# Patient Record
Sex: MALE | Race: WHITE
[De-identification: names, ages, dates, MRNs, and addresses within clinical notes are randomized per-mention and may not be internally consistent; named-entity substitution may affect disease eponyms.]

---

## 2018-03-19 ENCOUNTER — HOSPITAL ENCOUNTER (INPATIENT)
Dept: HOSPITAL 74 - FM/S | Age: 68
LOS: 2 days | Discharge: HOME HEALTH | DRG: 470 | End: 2018-03-21
Attending: ORTHOPAEDIC SURGERY | Admitting: ORTHOPAEDIC SURGERY
Payer: COMMERCIAL

## 2018-03-19 VITALS — BODY MASS INDEX: 33 KG/M2

## 2018-03-19 DIAGNOSIS — I10: ICD-10-CM

## 2018-03-19 DIAGNOSIS — E11.9: ICD-10-CM

## 2018-03-19 DIAGNOSIS — M16.12: Primary | ICD-10-CM

## 2018-03-19 DIAGNOSIS — E78.5: ICD-10-CM

## 2018-03-19 PROCEDURE — 8E0W0CZ ROBOTIC ASSISTED PROCEDURE OF TRUNK REGION, OPEN APPROACH: ICD-10-PCS | Performed by: ORTHOPAEDIC SURGERY

## 2018-03-19 PROCEDURE — 0SRB03A REPLACEMENT OF LEFT HIP JOINT WITH CERAMIC SYNTHETIC SUBSTITUTE, UNCEMENTED, OPEN APPROACH: ICD-10-PCS | Performed by: ORTHOPAEDIC SURGERY

## 2018-03-19 RX ADMIN — CELECOXIB SCH MG: 200 CAPSULE ORAL at 21:17

## 2018-03-19 RX ADMIN — CEFAZOLIN SODIUM SCH MLS/HR: 2 SOLUTION INTRAVENOUS at 18:20

## 2018-03-19 RX ADMIN — METFORMIN HYDROCHLORIDE SCH MG: 500 TABLET ORAL at 15:57

## 2018-03-19 RX ADMIN — OXYCODONE HYDROCHLORIDE AND ACETAMINOPHEN SCH MG: 500 TABLET ORAL at 21:20

## 2018-03-19 RX ADMIN — KETOROLAC TROMETHAMINE SCH MG: 30 INJECTION INTRAMUSCULAR; INTRAVENOUS at 17:33

## 2018-03-19 RX ADMIN — DOCUSATE SODIUM,SENNOSIDES SCH TABLET: 50; 8.6 TABLET, FILM COATED ORAL at 21:20

## 2018-03-19 RX ADMIN — OXYCODONE HYDROCHLORIDE SCH MG: 10 TABLET, FILM COATED, EXTENDED RELEASE ORAL at 21:17

## 2018-03-19 RX ADMIN — ACETAMINOPHEN SCH MG: 325 TABLET ORAL at 17:32

## 2018-03-19 RX ADMIN — GABAPENTIN SCH MG: 300 CAPSULE ORAL at 21:17

## 2018-03-19 RX ADMIN — KETOROLAC TROMETHAMINE SCH: 30 INJECTION INTRAMUSCULAR; INTRAVENOUS at 13:44

## 2018-03-19 NOTE — HP
Admitting History and Physical





- Admission


Chief Complaint: Left hip osteoarthritis x years


History of Present Illness: 


67 year old male presents in regard to his left hip. Longstanding history of 

left hip osteoarthritis. Patient complains of pain, limited ROM, difficulty 

ambulating and difficulty completing activities of daily living, such as 

putting on his socks and shoes. Patient has failed all conservative treatment 

measures including PO medication, injections, activity modification and 

exercise program. At this point, patient would like to proceed with the left 

total hip arthroplasty - MAKOplasty.





History Source: Patient





- Past Medical History


Cardiovascular: Yes: HTN, Hyperlipdemia


Endocrine: Yes: Diabetes Mellitus





- Past Surgical History


Additional Past Surgical History: 


See written history & physical








- Smoking History


Smoking history: Never smoked


Have you smoked in the past 12 months: No





- Alcohol/Substance Use


Hx Alcohol Use: No (RARELY)





Home Medications





- Allergies


Allergies/Adverse Reactions: 


 Allergies











Allergy/AdvReac Type Severity Reaction Status Date / Time


 


No Known Drug Allergies Allergy   Verified 03/15/18 13:10














- Home Medications


Home Medications: 


Ambulatory Orders





Aspirin [Ecotrin] 81 mg PO DAILY 03/15/18 


Atorvastatin Ca [Lipitor] 40 mg PO DAILY 03/15/18 


Metformin HCl 500 mg PO BID 03/15/18 


Ramipril 10 mg PO DAILY 03/15/18 











Review of Systems





- Review of Systems


Musculoskeletal: reports: Decreased ROM (left hip), Joint Pain (left hip)





Physical Examination


Vital Signs: 


 Vital Signs











Temperature  98.4 F   03/19/18 06:38


 


Pulse Rate  84   03/19/18 06:38


 


Respiratory Rate  18   03/19/18 06:38


 


Blood Pressure  150/92   03/19/18 06:38


 


O2 Sat by Pulse Oximetry (%)      











Constitutional: Yes: Well Nourished, No Distress


Eyes: Yes: Conjunctiva Clear


HENT: Yes: Atraumatic, Normocephalic


Neck: Yes: Supple


Cardiovascular: Yes: Regular Rate and Rhythm


Respiratory: Yes: Regular


Gastrointestinal: Yes: Soft


...Rectal Exam: Yes: Deferred


Musculoskeletal: Yes: Joint Stiffness (left hip), Joint Swelling (left hip)





Assessment/Plan


67 year old male presenting in regard to his left hip. Longstanding history of 

left hip osteoarthritis. Patient complains of pain, limited ROM, difficulty 

ambulating and difficulty completing activities of daily living, such as 

putting on his socks and shoes. Patient has failed all conservative treatment 

measures including PO medication, injections, activity modification and 

exercise program. Pros, cons, risks, benefits and alternatives of a left total 

hip arthroplasty - MAKOplasty were explained to the patient in detail. Patient 

confirms his understanding and wishes to proceed with a left total hip 

arthroplasty - MAKOplasty.

## 2018-03-19 NOTE — OP
Operative Note





- Note:


Operative Date: 03/19/18


Pre-Operative Diagnosis: left hip OA


Operation: left ana SERJIO


Post-Operative Diagnosis: Same as Pre-op


Surgeon: Pankaj Adan


Assistant: Val Darby


Anesthesia: Spinal


Estimated Blood Loss (mls): 200

## 2018-03-19 NOTE — SPEC
DATE OF OPERATION:  03/19/2018

 

PREOPERATIVE DIAGNOSIS:  Left hip osteoarthritis.

 

POSTOPERATIVE DIAGNOSIS:  Left hip osteoarthritis.

 

PROCEDURE:  Left total hip replacement with MAKOplasty robotic navigation.

 

ATTENDING:  Radha Finch MD 

 

ASSISTANT:  VASU Watson 

 

ANESTHESIA:  Spinal plus sedation.

 

ESTIMATED BLOOD LOSS:  200 mL.

 

COMPLICATIONS:  None.

 

SPECIMENS:  Resected bone was sent for pathology analysis.

 

DISPOSITION:  The patient was taken to the PACU in stable condition.

 

IMPLANTS USED:  [place] Accolade II size 6 femoral component, [place] Tritanum 54-mm

acetabular component with MDM liner and head ball and ceramic inner head.

 

INDICATIONS:  This is a 67-year-old male who presented to the office complaining of

severe left hip pain.  He was seen and examined by Dr. Finch and diagnosed with

severe left hip osteoarthritis.  He was initially treated nonoperatively with

medication and physical therapy but continued to have severe pain that limited his

activities of daily life.  We discussed surgery, and the patient was indicated for a

left total hip replacement.  The risks, benefits, and alternatives to the surgery

were explained to the patient in great detail, and he elected to proceed with the

procedure.

 

DESCRIPTION OF PROCEDURE:  On the day of surgery, the patient was taken to the

operating room and placed on the OR table.  Spinal anesthesia was administered by the

anesthesiologist.  The patient was then positioned in the lateral decubitus position

on the table and all bony prominences were padded.  An axillary roll was placed.  The

operative hip was then prepped and draped in the usual sterile fashion and

intravenous antibiotics were given for infection prophylaxis.  A surgical time-out

was then performed with the team, and the patients identity, procedure, side,

availability of implants, and the administration of antibiotics were confirmed.  

 

An approximately 15-cm longitudinal incision was made through the skin centered on

the greater trochanter of the hip.  This dissection was carried down through the

subcutaneous tissues to the deep fascia.  This fascia was then incised and a Cobra

was placed around the inferior femoral neck.  Electrocautery was used to reflect the

anterior 40% of the gluteus medius and minimus starting at the musculotendinous

junction and leaving a cuff for closure.  This was reflected to reveal the capsule of

the hip joint.  An anterior capsulectomy was performed and the femoral head and neck

were visualized.  Grade 4 changes were noted diffusely throughout the joint.

 

At this point, three small stab incisions were made superior to the main incision

along the iliac crest.  Three self-drilling Steinmann pins were then placed and the

Sweetgreen pelvic array was attached.   Reference points on the limb were then entered into

the robotic device and the limb length deficiency, offset, and femoral neck resection

level were then calculated by the software.  The hip was then dislocated with

traction and external rotation.  An oscillating saw was used to make the femoral neck

cut at the level previously templated, and the femoral head was removed. 

 

Attention was then turned to the acetabulum.  Retractors were then placed around the

acetabulum and the labrum was removed.  An acetabular checkpoint pin and the Sweetgreen

software were used to register the contours of the acetabulum.  The acetabulum was

then reamed in a single stage to the preoperatively templated size using the Sweetgreen

robotic arm.  The appropriately sized cup was then impacted and had solid fixation as

well as the preset inclination and version of 40 and 20 degrees, respectively.  A

polyethylene liner was then placed in the cup.

 

Attention was then turned back to the femur, which was externally rotated for

improved visualization. A femoral neck elevator was used to present the femoral neck

cut, a box osteotome was used to enter the femoral canal, and a canal finder was used

to go down the femoral shaft.  The Akl broaches were used sequentially until the

optimal scratch fit was achieved.  This correlated with the preoperatively templated

size.  From here, several different offset head and neck configurations were tested

until excellent stability and length were obtained.  These measurements were

quantified using the Sweetgreen software.

 

All trial components were then removed, the femur was copiously irrigated, and the

final components were placed.  Leg length and stability were checked again and found

to be excellent.  Irrigation was performed again.  After final implants were placed,

a 3-minute dilute Betadine lavage was performed.  Following this, the wound was

thoroughly irrigated with normal saline via a pulsatile lavage device, and wound

closure was begun.  Wound closure was started by repairing the abductor muscles with

a no. 2 FiberWire stitch in a Krackow configuration passed through bone tunnels in

the greater trochanter and tied over a bony bridge.  This repair was then reinforced

with a 0 V-Loc 180 barbed suture.  Next, no. 1 Polysorb and 0 V-Loc 180 were used to

close the fascia.  The deep subcutaneous tissue was closed with no. 1 Polysorb

sutures, and 2-0 Polysorb was used for the superficial subcutaneous tissue.  The skin

was closed using both 3-0 V-Loc 90 suture in a running subcuticular fashion and

SwiftSet skin adhesive.   The Kal array and pins were removed from the iliac crest

and the stab incision sites were irrigated and closed with 4-0 Polysorb sutures and

SwiftSet skin adhesive.  Once this was completed, a sterile dressing was applied. 

The patient was then awakened and taken to the PACU in stable condition.

 

 

 

RADHA FINCH M.D.

 

[id number]

DD: 03/19/2018 11:59

DT: 03/19/2018 14:22

Job #:  [id number]

## 2018-03-20 LAB
ANION GAP SERPL CALC-SCNC: 8 MMOL/L (ref 8–16)
BUN SERPL-MCNC: 26 MG/DL (ref 7–18)
CALCIUM SERPL-MCNC: 8.2 MG/DL (ref 8.4–10.2)
CHLORIDE SERPL-SCNC: 100 MMOL/L (ref 98–107)
CO2 SERPL-SCNC: 22 MMOL/L (ref 22–28)
CREAT SERPL-MCNC: 1.2 MG/DL (ref 0.6–1.3)
DEPRECATED RDW RBC AUTO: 12.6 % (ref 11.9–15.9)
GLUCOSE SERPL-MCNC: 218 MG/DL (ref 74–106)
HCT VFR BLD CALC: 40 % (ref 35.4–49)
HGB BLD-MCNC: 13.8 GM/DL (ref 11.7–16.9)
MCH RBC QN AUTO: 29.5 PG (ref 25.7–33.7)
MCHC RBC AUTO-ENTMCNC: 34.5 G/DL (ref 32–35.9)
MCV RBC: 85.4 FL (ref 80–96)
PLATELET # BLD AUTO: 200 K/MM3 (ref 134–434)
PMV BLD: 8.5 FL (ref 7.5–11.1)
POTASSIUM SERPLBLD-SCNC: 4.3 MMOL/L (ref 3.5–5.1)
RBC # BLD AUTO: 4.69 M/MM3 (ref 4–5.6)
SODIUM SERPL-SCNC: 130 MMOL/L (ref 136–145)
WBC # BLD AUTO: 13.6 K/MM3 (ref 4–10.8)

## 2018-03-20 RX ADMIN — ACETAMINOPHEN SCH MG: 325 TABLET ORAL at 00:11

## 2018-03-20 RX ADMIN — OXYCODONE HYDROCHLORIDE AND ACETAMINOPHEN SCH MG: 500 TABLET ORAL at 09:35

## 2018-03-20 RX ADMIN — CEFAZOLIN SODIUM SCH MLS/HR: 2 SOLUTION INTRAVENOUS at 01:07

## 2018-03-20 RX ADMIN — OXYCODONE HYDROCHLORIDE SCH MG: 10 TABLET, FILM COATED, EXTENDED RELEASE ORAL at 22:22

## 2018-03-20 RX ADMIN — DOCUSATE SODIUM,SENNOSIDES SCH TABLET: 50; 8.6 TABLET, FILM COATED ORAL at 22:22

## 2018-03-20 RX ADMIN — PANTOPRAZOLE SODIUM SCH MG: 40 TABLET, DELAYED RELEASE ORAL at 09:35

## 2018-03-20 RX ADMIN — METFORMIN HYDROCHLORIDE SCH MG: 500 TABLET ORAL at 17:47

## 2018-03-20 RX ADMIN — CELECOXIB SCH MG: 200 CAPSULE ORAL at 09:35

## 2018-03-20 RX ADMIN — ATORVASTATIN CALCIUM SCH MG: 40 TABLET, FILM COATED ORAL at 09:35

## 2018-03-20 RX ADMIN — GABAPENTIN SCH MG: 300 CAPSULE ORAL at 09:35

## 2018-03-20 RX ADMIN — CELECOXIB SCH MG: 200 CAPSULE ORAL at 22:22

## 2018-03-20 RX ADMIN — ASPIRIN 325 MG ORAL TABLET SCH MG: 325 PILL ORAL at 08:25

## 2018-03-20 RX ADMIN — KETOROLAC TROMETHAMINE SCH MG: 30 INJECTION INTRAMUSCULAR; INTRAVENOUS at 00:11

## 2018-03-20 RX ADMIN — ACETAMINOPHEN SCH: 325 TABLET ORAL at 12:14

## 2018-03-20 RX ADMIN — GABAPENTIN SCH MG: 300 CAPSULE ORAL at 22:22

## 2018-03-20 RX ADMIN — RAMIPRIL SCH MG: 5 CAPSULE ORAL at 09:36

## 2018-03-20 RX ADMIN — METFORMIN HYDROCHLORIDE SCH MG: 500 TABLET ORAL at 06:31

## 2018-03-20 RX ADMIN — ACETAMINOPHEN SCH MG: 325 TABLET ORAL at 06:30

## 2018-03-20 RX ADMIN — MULTIVITAMIN TABLET SCH TAB: TABLET at 09:35

## 2018-03-20 RX ADMIN — OXYCODONE HYDROCHLORIDE SCH MG: 10 TABLET, FILM COATED, EXTENDED RELEASE ORAL at 09:36

## 2018-03-20 RX ADMIN — ACETAMINOPHEN SCH MG: 325 TABLET ORAL at 17:48

## 2018-03-20 RX ADMIN — DOCUSATE SODIUM,SENNOSIDES SCH TABLET: 50; 8.6 TABLET, FILM COATED ORAL at 09:35

## 2018-03-20 RX ADMIN — KETOROLAC TROMETHAMINE SCH MG: 30 INJECTION INTRAMUSCULAR; INTRAVENOUS at 06:31

## 2018-03-20 RX ADMIN — OXYCODONE HYDROCHLORIDE AND ACETAMINOPHEN SCH MG: 500 TABLET ORAL at 22:23

## 2018-03-20 NOTE — PN
Progress Note (short form)





- Note


Progress Note: 





67M POD1 s/p left total hip replacement under spinal with peripheral nerve 

block for post operative pain control.


Pt states that pain is well controlled. Reports no anesthetic complications.


AVSS. Sensory and motor function intact in bilateral lower extremities.


Continue current regimen.

## 2018-03-20 NOTE — PN
Progress Note (short form)





- Note


Progress Note: 





Pt seen and examined.  Doing well.





AVSS





 Selected Entries











  03/20/18 03/20/18





  09:00 14:00


 


Temperature  97.8 F


 


Pulse Rate  90


 


Respiratory  18





Rate  


 


Blood Pressure  119/65


 


O2 Sat by Pulse 95 94 L





Oximetry (%)  


 


Oxygen Delivery Room Air Room Air





Method  








 Laboratory Tests











  03/20/18 03/20/18 03/20/18





  06:35 08:00 08:00


 


WBC   13.6 H 


 


Hgb   13.8 


 


Hct   40.0 


 


Plt Count   200 


 


Sodium    130 L


 


Potassium    4.3


 


Chloride    100


 


Anion Gap    8


 


BUN    26 H


 


Creatinine    1.2


 


POC Glucometer  271  


 


Random Glucose    218 H


 


Calcium    8.2 L











Gen: NAD





LLE: c/d/i, NVID





A/P 68yo male POD#1 s/p L SERJIO





1.  PT/OOB - WBAT LLE





2.  Pt may shower with assistance.





3.  Plan for d/c home Thursday in light of tomorrow's weather.

## 2018-03-21 VITALS — SYSTOLIC BLOOD PRESSURE: 119 MMHG | TEMPERATURE: 98.3 F | HEART RATE: 78 BPM | DIASTOLIC BLOOD PRESSURE: 64 MMHG

## 2018-03-21 LAB
DEPRECATED RDW RBC AUTO: 12.6 % (ref 11.9–15.9)
HCT VFR BLD CALC: 36.1 % (ref 35.4–49)
HGB BLD-MCNC: 12.7 GM/DL (ref 11.7–16.9)
MCH RBC QN AUTO: 30.4 PG (ref 25.7–33.7)
MCHC RBC AUTO-ENTMCNC: 35.2 G/DL (ref 32–35.9)
MCV RBC: 86.4 FL (ref 80–96)
PLATELET # BLD AUTO: 151 K/MM3 (ref 134–434)
PMV BLD: 8.7 FL (ref 7.5–11.1)
RBC # BLD AUTO: 4.18 M/MM3 (ref 4–5.6)
WBC # BLD AUTO: 11 K/MM3 (ref 4–10.8)

## 2018-03-21 RX ADMIN — OXYCODONE HYDROCHLORIDE AND ACETAMINOPHEN SCH MG: 500 TABLET ORAL at 09:20

## 2018-03-21 RX ADMIN — METFORMIN HYDROCHLORIDE SCH MG: 500 TABLET ORAL at 06:28

## 2018-03-21 RX ADMIN — GABAPENTIN SCH MG: 300 CAPSULE ORAL at 09:20

## 2018-03-21 RX ADMIN — PANTOPRAZOLE SODIUM SCH MG: 40 TABLET, DELAYED RELEASE ORAL at 09:19

## 2018-03-21 RX ADMIN — OXYCODONE HYDROCHLORIDE SCH MG: 10 TABLET, FILM COATED, EXTENDED RELEASE ORAL at 09:19

## 2018-03-21 RX ADMIN — ACETAMINOPHEN SCH MG: 325 TABLET ORAL at 06:27

## 2018-03-21 RX ADMIN — ATORVASTATIN CALCIUM SCH MG: 40 TABLET, FILM COATED ORAL at 09:19

## 2018-03-21 RX ADMIN — ACETAMINOPHEN SCH: 325 TABLET ORAL at 00:03

## 2018-03-21 RX ADMIN — CELECOXIB SCH MG: 200 CAPSULE ORAL at 09:19

## 2018-03-21 RX ADMIN — DOCUSATE SODIUM,SENNOSIDES SCH TABLET: 50; 8.6 TABLET, FILM COATED ORAL at 09:19

## 2018-03-21 RX ADMIN — ASPIRIN 325 MG ORAL TABLET SCH MG: 325 PILL ORAL at 08:11

## 2018-03-21 RX ADMIN — MULTIVITAMIN TABLET SCH TAB: TABLET at 09:20

## 2018-03-21 RX ADMIN — RAMIPRIL SCH MG: 5 CAPSULE ORAL at 09:18

## 2018-03-21 NOTE — DS
Physical Examination


Vital Signs: 


 Vital Signs











Temperature  98.3 F   03/21/18 06:00


 


Pulse Rate  78   03/21/18 06:00


 


Respiratory Rate  18   03/21/18 08:33


 


Blood Pressure  119/64   03/21/18 06:00


 


O2 Sat by Pulse Oximetry (%)  96   03/21/18 08:33











Labs: 


 CBC, BMP





 03/21/18 08:00 





 03/20/18 08:00 











Discharge Summary


Reason For Visit: LEFT HIP OSTEOARTHRITIS


Current Active Problems





Osteoarthritis of left hip (Acute)








Procedures: Principal: Left Kal SERJIO


Hospital Course: 





Admitted for elective surgery. Procedure performed without complications. 


Pt received postoperative antibiotic prophylaxis and DVT ppx.


Ambulated with physical therapy. Stable for discharge home with outpatient 

followup.


Condition: Stable





- Instructions


Diet, Activity, Other Instructions: 


Dr Adan - Hip Replacement Instructions





Keep the Aquacel dressing on until removed by Dr. Adan in 10-14 days - 


it is antibacterial and waterproof and you can shower with it on.





Call the office for a follow-up appointment with Dr. Adan in 10-14 days. [phone]





Take one Aspirin 325mg daily for 6 weeks to prevent blood clots in your legs.





Take one Pantoprazole 40mg daily for 6 weeks to protect against heartburn and 

ulcers.





Take Celebrex 200mg daily for 30 days to reduce swelling and inflammation.





Take a multivitamin, stool softener, and extra vitamin C supplement daily.





Take Cephalexin (an antibiotic) 3x/day for 10 days to protect against skin 

infection while the incision heals.





For pain:





*Mild pain (1-3/10):





Take 1 Tramadol tablet every 4 hours as needed.





**Moderate pain (4-6/10):





Take 1 Tramadol tablet and 1 Percocet tablet every 4 hours as needed.





*** Severe pain (7-10/10):





Take 1 Tramadol tablet and 2 Percocet tablets every 4 hours as needed.





Activity: You can put as much weight on the operative leg as you want. 


For the first 6 weeks, all you need to do is walk around the house, 


go up/down stairs, and sit down/get up. 


After 6 weeks when everything is healed (and bone has grown into the implant) 


you will be sent for more intensive outpatient physical therapy.





Always use a walker or cane for balance and to prevent falls.


Disposition: VNS/HOME HEALTH CARE





- Home Medications


Comprehensive Discharge Medication List: 


Ambulatory Orders





Atorvastatin Ca [Lipitor] 40 mg PO DAILY 03/15/18 


Metformin HCl 500 mg PO BID 03/15/18 


Ramipril 10 mg PO DAILY 03/15/18 


Ascorbic Acid [Vitamin C -] 500 mg PO BID  tablet 03/21/18 


Aspirin [ASA -] 325 mg PO DAILY@0800  tablet 03/21/18 


Celecoxib [CeleBREX -] 200 mg PO BID #30 capsule 03/21/18 


Cephalexin Monohydrate [Keflex -] 500 mg PO TID #30 capsule 03/21/18 


Multivitamins [Multivit ([facility] Formul[facility])] 1 tab PO DAILY  tab 03/21/18 


Oxycodone HCl/Acetaminophen [Percocet 5-325 mg Tablet] 1 - 2 tab PO Q4H PRN #60 

tablet MDD 8 03/21/18 


Pantoprazole Sodium [Protonix -] 40 mg PO DAILY #40 tablet.ec 03/21/18 


Sennosides/Docusate Sodium [Pericolace -] 2 tablet PO BID  tablet 03/21/18 


traMADol HCL [Ultram -] 50 mg PO Q4H #90 tablet MDD 6 03/21/18

## 2018-03-22 NOTE — PATH
Surgical Pathology Report



Patient Name:  KODY CAMPBELL

Accession #:  

Med. Rec. #:  [id number]                                                        

   /Age/Gender:  1950 (Age: 67) / M

Account:  [id number]                                                          

             Location: [facility]

Taken:  3/19/2018

Received:  3/19/2018

Reported:  3/22/2018

Physicians:  Pankaj Adan M.D.

  



Specimen(s) Received

 LEFT FEMORAL HEAD 





Clinical History

Left hip osteoarthritis







Final Diagnosis

FEMORAL HEAD, LEFT, TOTAL HIP REPLACEMENT:

DEGENERATIVE JOINT DISEASE.





***Electronically Signed***

Juanita Escobar M.D.





Gross Description

Received in formalin, labeled "left femoral head," is a 4.5 x 4.5 x 4.3 cm.

femoral head with a 0.7 cm in length portion of femoral neck attached. The

margin of resection is smooth. There is a 4 cm in greatest dimension area of

eburnation present. The remaining articular surface is tan-yellow and diffusely

granular and nodular. The underlying trabecular bone is yellow and hard. A

representative section is submitted in one cassette, following decalcification. 



/3/20/2018



[facility]/3/20/2018